# Patient Record
Sex: FEMALE | Race: WHITE | URBAN - METROPOLITAN AREA
[De-identification: names, ages, dates, MRNs, and addresses within clinical notes are randomized per-mention and may not be internally consistent; named-entity substitution may affect disease eponyms.]

---

## 2017-02-08 ENCOUNTER — HOSPITAL ENCOUNTER (OUTPATIENT)
Dept: PREADMISSION TESTING | Age: 47
Discharge: HOME | End: 2017-03-01

## 2025-02-08 ENCOUNTER — HOSPITAL ENCOUNTER (EMERGENCY)
Age: 55
Discharge: HOME | End: 2025-02-08
Payer: MEDICARE

## 2025-02-08 VITALS
HEART RATE: 90 BPM | HEIGHT: 64 IN | OXYGEN SATURATION: 98 % | TEMPERATURE: 98 F | WEIGHT: 234 LBS | DIASTOLIC BLOOD PRESSURE: 90 MMHG | RESPIRATION RATE: 20 BRPM | SYSTOLIC BLOOD PRESSURE: 143 MMHG | BODY MASS INDEX: 39.95 KG/M2

## 2025-02-08 DIAGNOSIS — M54.41 RIGHT-SIDED LOW BACK PAIN WITH RIGHT-SIDED SCIATICA, UNSPECIFIED CHRONICITY: Primary | ICD-10-CM

## 2025-02-08 PROCEDURE — 99284 EMERGENCY DEPT VISIT MOD MDM: CPT

## 2025-02-08 PROCEDURE — 96372 THER/PROPH/DIAG INJ SC/IM: CPT

## 2025-02-08 PROCEDURE — 6370000000 HC RX 637 (ALT 250 FOR IP): Performed by: PHYSICIAN ASSISTANT

## 2025-02-08 PROCEDURE — 6360000002 HC RX W HCPCS: Performed by: PHYSICIAN ASSISTANT

## 2025-02-08 RX ORDER — DULAGLUTIDE 3 MG/.5ML
INJECTION, SOLUTION SUBCUTANEOUS
COMMUNITY
Start: 2024-12-02

## 2025-02-08 RX ORDER — LORAZEPAM 0.5 MG
1 TABLET ORAL
COMMUNITY
Start: 2024-12-02

## 2025-02-08 RX ORDER — GABAPENTIN 400 MG/1
1 CAPSULE ORAL EVERY 8 HOURS
COMMUNITY
Start: 2025-01-29

## 2025-02-08 RX ORDER — ONDANSETRON 4 MG/1
4 TABLET, FILM COATED ORAL EVERY 8 HOURS PRN
Qty: 20 TABLET | Refills: 0 | Status: SHIPPED | OUTPATIENT
Start: 2025-02-08

## 2025-02-08 RX ORDER — FLUTICASONE PROPIONATE 50 MCG
2 SPRAY, SUSPENSION (ML) NASAL DAILY
COMMUNITY

## 2025-02-08 RX ORDER — HYDROCODONE BITARTRATE AND ACETAMINOPHEN 5; 325 MG/1; MG/1
1 TABLET ORAL ONCE
Status: COMPLETED | OUTPATIENT
Start: 2025-02-08 | End: 2025-02-08

## 2025-02-08 RX ORDER — ONDANSETRON 4 MG/1
4 TABLET, ORALLY DISINTEGRATING ORAL ONCE
Status: COMPLETED | OUTPATIENT
Start: 2025-02-08 | End: 2025-02-08

## 2025-02-08 RX ORDER — INSULIN LISPRO 100 [IU]/ML
INJECTION, SOLUTION INTRAVENOUS; SUBCUTANEOUS
COMMUNITY
Start: 2025-02-04

## 2025-02-08 RX ORDER — MONTELUKAST SODIUM 10 MG/1
1 TABLET ORAL NIGHTLY
COMMUNITY
Start: 2024-12-02

## 2025-02-08 RX ORDER — VITAMIN E 268 MG
400 CAPSULE ORAL 2 TIMES DAILY
COMMUNITY

## 2025-02-08 RX ORDER — LIDOCAINE 4 G/G
1 PATCH TOPICAL ONCE
Status: DISCONTINUED | OUTPATIENT
Start: 2025-02-08 | End: 2025-02-08 | Stop reason: HOSPADM

## 2025-02-08 RX ORDER — DEXAMETHASONE SODIUM PHOSPHATE 10 MG/ML
10 INJECTION, SOLUTION INTRAMUSCULAR; INTRAVENOUS ONCE
Status: COMPLETED | OUTPATIENT
Start: 2025-02-08 | End: 2025-02-08

## 2025-02-08 RX ORDER — KETOROLAC TROMETHAMINE 15 MG/ML
15 INJECTION, SOLUTION INTRAMUSCULAR; INTRAVENOUS ONCE
Status: COMPLETED | OUTPATIENT
Start: 2025-02-08 | End: 2025-02-08

## 2025-02-08 RX ORDER — FLUOXETINE 40 MG/1
40 CAPSULE ORAL DAILY
COMMUNITY

## 2025-02-08 RX ORDER — HYDROCODONE BITARTRATE AND ACETAMINOPHEN 5; 325 MG/1; MG/1
1 TABLET ORAL EVERY 4 HOURS PRN
Qty: 18 TABLET | Refills: 0 | Status: SHIPPED | OUTPATIENT
Start: 2025-02-08 | End: 2025-02-11

## 2025-02-08 RX ORDER — METHOCARBAMOL 500 MG/1
TABLET, FILM COATED ORAL
COMMUNITY
Start: 2025-01-29 | End: 2025-02-08

## 2025-02-08 RX ORDER — ORPHENADRINE CITRATE 100 MG/1
100 TABLET ORAL 2 TIMES DAILY
Qty: 20 TABLET | Refills: 0 | Status: SHIPPED | OUTPATIENT
Start: 2025-02-08 | End: 2025-02-18

## 2025-02-08 RX ORDER — ATENOLOL 25 MG/1
25 TABLET ORAL 2 TIMES DAILY
COMMUNITY
Start: 2024-09-09

## 2025-02-08 RX ORDER — LIDOCAINE 50 MG/G
1 PATCH TOPICAL DAILY
Qty: 10 PATCH | Refills: 0 | Status: SHIPPED | OUTPATIENT
Start: 2025-02-08 | End: 2025-02-18

## 2025-02-08 RX ORDER — HYDROXYZINE HYDROCHLORIDE 50 MG/1
TABLET, FILM COATED ORAL
COMMUNITY
Start: 2025-01-29

## 2025-02-08 RX ORDER — ATOGEPANT 30 MG/1
1 TABLET ORAL DAILY
COMMUNITY
Start: 2025-01-30

## 2025-02-08 RX ORDER — PANTOPRAZOLE SODIUM 40 MG/1
1 TABLET, DELAYED RELEASE ORAL DAILY
COMMUNITY
Start: 2024-12-02

## 2025-02-08 RX ORDER — BUPROPION HYDROCHLORIDE 150 MG/1
300 TABLET, EXTENDED RELEASE ORAL DAILY
COMMUNITY

## 2025-02-08 RX ORDER — ORPHENADRINE CITRATE 30 MG/ML
60 INJECTION INTRAMUSCULAR; INTRAVENOUS ONCE
Status: COMPLETED | OUTPATIENT
Start: 2025-02-08 | End: 2025-02-08

## 2025-02-08 RX ORDER — LUMATEPERONE 42 MG/1
CAPSULE ORAL
COMMUNITY

## 2025-02-08 RX ADMIN — DEXAMETHASONE SODIUM PHOSPHATE 10 MG: 10 INJECTION, SOLUTION INTRAMUSCULAR; INTRAVENOUS at 18:55

## 2025-02-08 RX ADMIN — ORPHENADRINE CITRATE 60 MG: 60 INJECTION INTRAMUSCULAR; INTRAVENOUS at 18:57

## 2025-02-08 RX ADMIN — ONDANSETRON 4 MG: 4 TABLET, ORALLY DISINTEGRATING ORAL at 18:55

## 2025-02-08 RX ADMIN — HYDROCODONE BITARTRATE AND ACETAMINOPHEN 1 TABLET: 5; 325 TABLET ORAL at 18:55

## 2025-02-08 RX ADMIN — KETOROLAC TROMETHAMINE 15 MG: 15 INJECTION, SOLUTION INTRAMUSCULAR; INTRAVENOUS at 18:56

## 2025-02-08 ASSESSMENT — PAIN - FUNCTIONAL ASSESSMENT
PAIN_FUNCTIONAL_ASSESSMENT: ACTIVITIES ARE NOT PREVENTED
PAIN_FUNCTIONAL_ASSESSMENT: 0-10

## 2025-02-08 ASSESSMENT — ENCOUNTER SYMPTOMS
VOMITING: 0
RHINORRHEA: 0
ABDOMINAL PAIN: 0
COUGH: 0
NAUSEA: 0
BACK PAIN: 1
SHORTNESS OF BREATH: 0
DIARRHEA: 0

## 2025-02-08 ASSESSMENT — PAIN SCALES - GENERAL
PAINLEVEL_OUTOF10: 9
PAINLEVEL_OUTOF10: 9

## 2025-02-08 ASSESSMENT — PAIN DESCRIPTION - PAIN TYPE: TYPE: ACUTE PAIN

## 2025-02-08 ASSESSMENT — PAIN DESCRIPTION - ORIENTATION: ORIENTATION: RIGHT

## 2025-02-08 ASSESSMENT — PAIN DESCRIPTION - DESCRIPTORS: DESCRIPTORS: SHARP

## 2025-02-08 ASSESSMENT — PAIN DESCRIPTION - ONSET: ONSET: ON-GOING

## 2025-02-08 ASSESSMENT — PAIN DESCRIPTION - FREQUENCY: FREQUENCY: CONTINUOUS

## 2025-02-08 ASSESSMENT — PAIN DESCRIPTION - LOCATION: LOCATION: LEG

## 2025-02-08 NOTE — ED PROVIDER NOTES
[facility] EMERGENCY DEPARTMENT  EMERGENCY DEPARTMENT ENCOUNTER        Pt Name: Minnie Bradshaw  MRN: [id number]  Birthdate 1970  Date of evaluation: 2/8/2025  Provider: Kelly Lopez PA-C  PCP: Hyacinth Morales APRN - CNP  Note Started: 6:41 PM EST 2/8/25      HENOK. I have evaluated this patient.        CHIEF COMPLAINT       Chief Complaint   Patient presents with    Leg Pain     Pt brought to the hospital due to having leg pain for the past approximately month, was seen at [facility] last month, and has had zero improvement.         HISTORY OF PRESENT ILLNESS: 1 or more Elements     History From: Patient, friend at bedside  Limitations to history : None    Minnie Bradshaw is a 54 y.o. female who presents to the emergency department today for evaluation for concerns of right-sided low back pain, with radiation down her right leg.  The patient reports that she was seen in [facility] on 1/25/2025 for similar symptoms.  She reports that she did have a CT scan at that time.  She states that she actually has an appointment with neurosurgery on 2/25/2025.  The patient reports that she has had continued pain since.  She states that she is on gabapentin chronically.  She states she is also been taking Robaxin without much improvement.  Patient reports that her pain is currently rated as a 9/10, pain is worse with touch and movement.  Improves with certain positions.  She denies any bowel or bladder incontinence or retention.  No saddle anesthesias.  She has no recent injections into the back.  She states that she did fall over 6 months ago but has not had any fall since she has not any fevers.  No nausea or vomiting.  She is a diabetic and glucose has otherwise been controlled patient otherwise has no    Nursing Notes were all reviewed and agreed with or any disagreements were addressed in the HPI.    REVIEW OF SYSTEMS :      Review of Systems   Constitutional:  Negative for activity change,

## 2025-03-03 ENCOUNTER — APPOINTMENT (OUTPATIENT)
Dept: GENERAL RADIOLOGY | Age: 55
End: 2025-03-03
Payer: MEDICARE

## 2025-03-03 ENCOUNTER — HOSPITAL ENCOUNTER (EMERGENCY)
Age: 55
Discharge: HOME | End: 2025-03-03
Payer: MEDICARE

## 2025-03-03 VITALS
OXYGEN SATURATION: 97 % | HEART RATE: 76 BPM | HEIGHT: 64 IN | TEMPERATURE: 98.3 F | SYSTOLIC BLOOD PRESSURE: 153 MMHG | DIASTOLIC BLOOD PRESSURE: 80 MMHG | RESPIRATION RATE: 20 BRPM | BODY MASS INDEX: 40.97 KG/M2 | WEIGHT: 240 LBS

## 2025-03-03 DIAGNOSIS — S80.01XA CONTUSION OF RIGHT KNEE, INITIAL ENCOUNTER: ICD-10-CM

## 2025-03-03 DIAGNOSIS — M54.31 SCIATICA OF RIGHT SIDE: Primary | ICD-10-CM
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
## 2025-03-03 PROCEDURE — 96372 THER/PROPH/DIAG INJ SC/IM: CPT

## 2025-03-03 PROCEDURE — 99284 EMERGENCY DEPT VISIT MOD MDM: CPT

## 2025-03-03 PROCEDURE — 6370000000 HC RX 637 (ALT 250 FOR IP)

## 2025-03-03 PROCEDURE — 6360000002 HC RX W HCPCS

## 2025-03-03 PROCEDURE — 73564 X-RAY EXAM KNEE 4 OR MORE: CPT

## 2025-03-03 RX ORDER — CYCLOBENZAPRINE HCL 10 MG
10 TABLET ORAL ONCE
Status: COMPLETED | OUTPATIENT
Start: 2025-03-03 | End: 2025-03-03

## 2025-03-03 RX ORDER — NAPROXEN 500 MG/1
500 TABLET ORAL 2 TIMES DAILY WITH MEALS
Qty: 60 TABLET | Refills: 0 | Status: SHIPPED | OUTPATIENT
Start: 2025-03-03

## 2025-03-03 RX ORDER — KETOROLAC TROMETHAMINE 30 MG/ML
30 INJECTION, SOLUTION INTRAMUSCULAR; INTRAVENOUS ONCE
Status: COMPLETED | OUTPATIENT
Start: 2025-03-03 | End: 2025-03-03

## 2025-03-03 RX ORDER — LIDOCAINE 4 G/G
1 PATCH TOPICAL DAILY
Status: DISCONTINUED | OUTPATIENT
Start: 2025-03-03 | End: 2025-03-03 | Stop reason: HOSPADM

## 2025-03-03 RX ORDER — LIDOCAINE 50 MG/G
1 PATCH TOPICAL DAILY
Qty: 10 PATCH | Refills: 0 | Status: SHIPPED | OUTPATIENT
Start: 2025-03-03 | End: 2025-03-13

## 2025-03-03 RX ADMIN — KETOROLAC TROMETHAMINE 30 MG: 30 INJECTION, SOLUTION INTRAMUSCULAR at 18:42

## 2025-03-03 RX ADMIN — CYCLOBENZAPRINE HYDROCHLORIDE 10 MG: 10 TABLET, FILM COATED ORAL at 18:43

## 2025-03-03 ASSESSMENT — PAIN - FUNCTIONAL ASSESSMENT
PAIN_FUNCTIONAL_ASSESSMENT: ACTIVITIES ARE NOT PREVENTED
PAIN_FUNCTIONAL_ASSESSMENT: 0-10

## 2025-03-03 ASSESSMENT — PAIN DESCRIPTION - FREQUENCY: FREQUENCY: CONTINUOUS

## 2025-03-03 ASSESSMENT — PAIN SCALES - GENERAL: PAINLEVEL_OUTOF10: 7

## 2025-03-03 ASSESSMENT — PAIN DESCRIPTION - PAIN TYPE: TYPE: ACUTE PAIN

## 2025-03-03 ASSESSMENT — PAIN DESCRIPTION - ORIENTATION: ORIENTATION: RIGHT

## 2025-03-03 ASSESSMENT — PAIN DESCRIPTION - LOCATION: LOCATION: LEG;KNEE

## 2025-03-03 ASSESSMENT — PAIN DESCRIPTION - DESCRIPTORS: DESCRIPTORS: ACHING

## 2025-03-03 ASSESSMENT — PAIN DESCRIPTION - ONSET: ONSET: ON-GOING

## 2025-03-03 NOTE — DISCHARGE INSTRUCTIONS
Your x-ray does not show any fracture or dislocation of the knee.    Your exam otherwise is consistent with your known sciatica.  I did send a prescription for naproxen to your pharmacy.  This is similar to Motrin, ibuprofen, Aleve, Advil, meloxicam, diclofenac (nonsteroidal anti-inflammatory drugs).  Okay to take Tylenol and apply any topical pain relief such as

## 2025-03-04 ASSESSMENT — ENCOUNTER SYMPTOMS
ABDOMINAL PAIN: 0
COLOR CHANGE: 0
BACK PAIN: 1
NAUSEA: 0
VOMITING: 0

## 2025-03-04 NOTE — ED PROVIDER NOTES
naproxen (NAPROSYN) 500 MG tablet Take 1 tablet by mouth 2 times daily (with meals), Disp-60 tablet, R-0Normal      lidocaine (LIDODERM) 5 % Place 1 patch onto the skin daily for 10 days 12 hours on, 12 hours off., Disp-10 patch, R-0Normal             DISCONTINUED MEDICATIONS:  Discharge Medication List as of 3/3/2025  7:04 PM                 (Please note that portions of this note were completed with a voice recognition program.  Efforts were made to edit the dictations but occasionally words are mis-transcribed.)    EMBER Darden CNP (electronically signed)      Juliet Montes APRN - CNP  03/04/25 0395

## 2025-03-07 ENCOUNTER — TELEPHONE (OUTPATIENT)
Dept: ORTHOPEDIC SURGERY | Age: 55
End: 2025-03-07

## 2025-03-10 ENCOUNTER — OFFICE VISIT (OUTPATIENT)
Dept: ORTHOPEDIC SURGERY | Age: 55
End: 2025-03-10
Payer: MEDICARE

## 2025-03-10 VITALS — BODY MASS INDEX: 40.97 KG/M2 | HEIGHT: 64 IN | WEIGHT: 240 LBS

## 2025-03-10 DIAGNOSIS — M54.50 LOW BACK PAIN, UNSPECIFIED BACK PAIN LATERALITY, UNSPECIFIED CHRONICITY, UNSPECIFIED WHETHER SCIATICA PRESENT: Primary | ICD-10-CM

## 2025-03-10 DIAGNOSIS — M47.27 MULTILEVEL LUMBOSACRAL SPONDYLOSIS WITH RADICULOPATHY: ICD-10-CM

## 2025-03-10 PROCEDURE — 1036F TOBACCO NON-USER: CPT | Performed by: PHYSICIAN ASSISTANT

## 2025-03-10 PROCEDURE — 3017F COLORECTAL CA SCREEN DOC REV: CPT | Performed by: PHYSICIAN ASSISTANT

## 2025-03-10 PROCEDURE — 99203 OFFICE O/P NEW LOW 30 MIN: CPT | Performed by: PHYSICIAN ASSISTANT

## 2025-03-10 PROCEDURE — G8417 CALC BMI ABV UP PARAM F/U: HCPCS | Performed by: PHYSICIAN ASSISTANT

## 2025-03-10 PROCEDURE — G8427 DOCREV CUR MEDS BY ELIG CLIN: HCPCS | Performed by: PHYSICIAN ASSISTANT

## 2025-03-10 NOTE — PROGRESS NOTES
History of present illness:   Ms. Minnie Bradshaw is a pleasant 54 y.o. female kindly referred by [facility] for consultation regarding her low back pain right medial and anterior thigh pain that radiates down to her right foot.  She states she has had chronic back pain for years.  The right leg began about 3 months ago with worsening low back pain.  Back pain 10/10 VAS, right and left buttock pain 0/10 VAS, right leg pain 10/10 VAS.   Pain is describes as sharp pain.   She denies numbness and tingling lower extremities.    She denies weakness of her right and left leg.  She denies bowel or bladder dysfunction and saddle anesthesia.   She can sit for a maximum of 10 minutes and stand for a maximum 10 minutes.   Pain does disrupt her sleep.   Prior medications and treatment tried include gabapentin 100 mg 3 times daily, Lidoderm patch, Naprosyn without relief.    She states she is currently disabled since 2001 after a C4-C7 fusion    Past medical history:  Her past medical history has been reviewed.  Past Medical History:   Diagnosis Date    Asthma     Bipolar affective (HCC)     Diabetes mellitus (HCC)     GERD (gastroesophageal reflux disease)     Hyperlipidemia     Hypertension     Mood disorder     Sleep apnea     CPAP set 12-20, fluctuates    Thyroid disease        Her past surgical history has been reviewed.  Past Surgical History:   Procedure Laterality Date    CERVICAL DISC SURGERY  2005    CYSTOSCOPY  1/26/11    HEEL SPUR SURGERY           Her medications and allergies were reviewed.  Current Outpatient Medications   Medication Sig Dispense Refill    naproxen (NAPROSYN) 500 MG tablet Take 1 tablet by mouth 2 times daily (with meals) 60 tablet 0    lidocaine (LIDODERM) 5 % Place 1 patch onto the skin daily for 10 days 12 hours on, 12 hours off. 10 patch 0    atenolol (TENORMIN) 25 MG tablet Take 1 tablet by mouth 2 times daily      Atogepant (QULIPTA) 30 MG TABS Take 1 tablet by mouth daily      buPROPion

## 2025-03-13 ENCOUNTER — HOSPITAL ENCOUNTER (OUTPATIENT)
Dept: MRI IMAGING | Age: 55
Discharge: HOME | End: 2025-03-13
Payer: MEDICARE

## 2025-03-13 DIAGNOSIS — M47.27 MULTILEVEL LUMBOSACRAL SPONDYLOSIS WITH RADICULOPATHY: ICD-10-CM

## 2025-03-13 PROCEDURE — 72148 MRI LUMBAR SPINE W/O DYE: CPT

## 2025-03-19 ENCOUNTER — RESULTS FOLLOW-UP (OUTPATIENT)
Dept: MRI IMAGING | Age: 55
End: 2025-03-19

## 2025-03-25 ENCOUNTER — TELEPHONE (OUTPATIENT)
Dept: ORTHOPEDIC SURGERY | Age: 55
End: 2025-03-25

## 2025-03-25 DIAGNOSIS — M47.27 MULTILEVEL LUMBOSACRAL SPONDYLOSIS WITH RADICULOPATHY: Primary | ICD-10-CM

## 2025-03-25 DIAGNOSIS — M48.062 SPINAL STENOSIS OF LUMBAR REGION WITH NEUROGENIC CLAUDICATION: ICD-10-CM

## 2025-03-25 NOTE — TELEPHONE ENCOUNTER
Called and LMOM will try again later this afternoon or tomorrow.    MRI Lumbar Spine:  IMPRESSION:  1. Severe spinal canal stenosis and severe bilateral neural foraminal  narrowing at L4-5.  2. Severe spinal canal stenosis, severe right and mild left neural foraminal  narrowing at L5-S1.  3. Mild spinal canal stenosis at L3-4.

## 2025-03-25 NOTE — TELEPHONE ENCOUNTER
Test Results     Type of Test: MRI  Date of Test: 3/13/25  Location of Test: [facility]  Patient Contact Number: [phone]

## 2025-03-26 NOTE — TELEPHONE ENCOUNTER
Called and discussed MRI.  Discussed Epidural steroid injection vs surgery.   She would prefer to see surgeon.    Surgical consultation with Hiram AVILES with [facility].

## 2025-07-02 NOTE — PROGRESS NOTES
PATIENT REACHED   YES____NO__X__    PREOP INSTRUCTIONS LEFT ON VM ZFANJI__[phone]_____________      DATE_7/10/25________ TIME__1215_______ARRIVAL_1115_______PLACE__[address]__________  NOTHING TO EAT OR DRINK  AFTER MIDNIGHT THE EVENING PRIOR OR AS INSTRUCTED BY YOUR DR.  YOU NEED A RESPONSIBLE ADULT AGE 18 OR OLDER TO DRIVE YOU HOME  PLEASE BRING INSURANCE CARD.PICTURE ID AND COMPLETE LIST OF MEDS  WEAR LOOSE COMFORTABLE CLOTHING  FOLLOW ANY INSTRUCTIONS YOUR DRS OFFICE HAS GIVEN YOU,INCLUDING WHAT MEDICATIONS TO TAKE THE AM OF PROCEDURE AND WHEN AND IF YOU NEED TO STOP ANY BLOOD THINNERS. IF YOU HAVE QUESTIONS REGARDING THIS CALL THE OFFICE  THE GOAL BLOOD SUGAR THE AM OF PROCEDURE  OR LESS ABOVE THAT THE PROCEDURE MAY BE CANCELLED  ANY QUESTIONS CALL YOUR DOCTOR.ALSO,PLEASE READ THE INSTRUCTION PACKET FROM YOUR DR IF YOU RECEIVED ONE.  SPINE INTERVENTION NUMBER [phone]      OTHER___________________________________      VISITOR POLICY(subject to change)    Current policy is 2 visitors per patient. No children. Masks are required.

## 2025-07-10 ENCOUNTER — APPOINTMENT (OUTPATIENT)
Dept: GENERAL RADIOLOGY | Age: 55
End: 2025-07-10
Attending: PHYSICAL MEDICINE & REHABILITATION
Payer: MEDICARE

## 2025-07-10 ENCOUNTER — HOSPITAL ENCOUNTER (OUTPATIENT)
Age: 55
Setting detail: OUTPATIENT SURGERY
Discharge: HOME | End: 2025-07-10
Attending: PHYSICAL MEDICINE & REHABILITATION | Admitting: PHYSICAL MEDICINE & REHABILITATION
Payer: MEDICARE

## 2025-07-10 VITALS
OXYGEN SATURATION: 98 % | BODY MASS INDEX: 40.29 KG/M2 | TEMPERATURE: 97.7 F | SYSTOLIC BLOOD PRESSURE: 109 MMHG | RESPIRATION RATE: 14 BRPM | DIASTOLIC BLOOD PRESSURE: 65 MMHG | WEIGHT: 236 LBS | HEART RATE: 65 BPM | HEIGHT: 64 IN

## 2025-07-10 LAB
GLUCOSE BLD-MCNC: 252 MG/DL (ref 70–99)
PERFORMED ON: ABNORMAL

## 2025-07-10 PROCEDURE — 6360000002 HC RX W HCPCS: Performed by: PHYSICAL MEDICINE & REHABILITATION

## 2025-07-10 PROCEDURE — 99152 MOD SED SAME PHYS/QHP 5/>YRS: CPT | Performed by: PHYSICAL MEDICINE & REHABILITATION

## 2025-07-10 PROCEDURE — 6360000004 HC RX CONTRAST MEDICATION: Performed by: PHYSICAL MEDICINE & REHABILITATION

## 2025-07-10 PROCEDURE — 3610000056 HC PAIN LEVEL 4 BASE (NON-OR): Performed by: PHYSICAL MEDICINE & REHABILITATION

## 2025-07-10 PROCEDURE — 77003 FLUOROGUIDE FOR SPINE INJECT: CPT

## 2025-07-10 PROCEDURE — 2709999900 HC NON-CHARGEABLE SUPPLY: Performed by: PHYSICAL MEDICINE & REHABILITATION

## 2025-07-10 RX ORDER — MIDAZOLAM HYDROCHLORIDE 1 MG/ML
INJECTION, SOLUTION INTRAMUSCULAR; INTRAVENOUS
Status: DISCONTINUED | OUTPATIENT
Start: 2025-07-10 | End: 2025-07-10 | Stop reason: ALTCHOICE

## 2025-07-10 RX ORDER — LIDOCAINE HYDROCHLORIDE 10 MG/ML
INJECTION, SOLUTION EPIDURAL; INFILTRATION; INTRACAUDAL; PERINEURAL
Status: DISCONTINUED | OUTPATIENT
Start: 2025-07-10 | End: 2025-07-10 | Stop reason: ALTCHOICE

## 2025-07-10 RX ORDER — AMLODIPINE BESYLATE 2.5 MG/1
2.5 TABLET ORAL DAILY
COMMUNITY

## 2025-07-10 RX ORDER — MECLIZINE HCL 25MG 25 MG/1
25 TABLET, CHEWABLE ORAL 3 TIMES DAILY PRN
COMMUNITY

## 2025-07-10 RX ORDER — BUSPIRONE HYDROCHLORIDE 30 MG/1
TABLET ORAL
COMMUNITY

## 2025-07-10 RX ORDER — FENTANYL CITRATE 50 UG/ML
INJECTION, SOLUTION INTRAMUSCULAR; INTRAVENOUS
Status: DISCONTINUED | OUTPATIENT
Start: 2025-07-10 | End: 2025-07-10 | Stop reason: ALTCHOICE

## 2025-07-10 RX ORDER — DEXAMETHASONE SODIUM PHOSPHATE 10 MG/ML
INJECTION, SOLUTION INTRAMUSCULAR; INTRAVENOUS
Status: DISCONTINUED | OUTPATIENT
Start: 2025-07-10 | End: 2025-07-10 | Stop reason: ALTCHOICE

## 2025-07-10 RX ORDER — IOPAMIDOL 612 MG/ML
INJECTION, SOLUTION INTRATHECAL
Status: DISCONTINUED | OUTPATIENT
Start: 2025-07-10 | End: 2025-07-10 | Stop reason: ALTCHOICE

## 2025-07-10 ASSESSMENT — PAIN - FUNCTIONAL ASSESSMENT
PAIN_FUNCTIONAL_ASSESSMENT: NONE - DENIES PAIN
PAIN_FUNCTIONAL_ASSESSMENT: NONE - DENIES PAIN
PAIN_FUNCTIONAL_ASSESSMENT: PREVENTS OR INTERFERES SOME ACTIVE ACTIVITIES AND ADLS
PAIN_FUNCTIONAL_ASSESSMENT: NONE - DENIES PAIN
PAIN_FUNCTIONAL_ASSESSMENT: 0-10
PAIN_FUNCTIONAL_ASSESSMENT: NONE - DENIES PAIN

## 2025-07-10 ASSESSMENT — PAIN DESCRIPTION - DESCRIPTORS: DESCRIPTORS: SHARP

## 2025-07-10 NOTE — OP NOTE
PATIENT:  Minnie Bradshaw  AGE:  54 yrs  MEDICAL RECORD #:  [id number]  YOB: 1970     DATE:  7/10/2025  PHYSICIAN: Jc Fernandez M.D.     PROCEDURE: Bilateral L4 transforaminal epidural steroid injection under fluoroscopy.     PRE-OP DIAGNOSIS:  Low Back Pain/Radiculopathy     POST-OP DIAGNOSIS:  same     HISTORY OF PRESENT ILLNESS:  See office notes. Patient has failed previous less-invasive treatments.     ALLERGIES:  Topamax     MEDICATIONS:    Current Facility-Administered Medications   Medication Dose Route Frequency Provider Last Rate Last Admin    fentaNYL (SUBLIMAZE) injection    Once PRN Jc Fernandez MD   25 mcg at 07/10/25 1202    midazolam (VERSED)    Once PRN Jc Fernandez MD   0.5 mg at 07/10/25 1202    dexAMETHasone (PF) (DECADRON)    Once PRN Jc Fernandez MD   20 mg at 07/10/25 1207    lidocaine PF 1 % injection    Once PRN Jc Fernandez MD   7 mg at 07/10/25 1207    iopamidol (ISOVUE-M 300) 61 % injection    Once PRN Jc Fernandez MD   3 mL at 07/10/25 1207        PHYSICAL EXAMINATION:              General:  Awake, alert              Heart:  No audible murmurs, extremities well perfused              Lungs:  No increased WOB or audible wheezing              Extremities:  Normal tone. Warm. No swelling.      Anesthesia: 0.5 mg Versed and 25 mcg fentanyl    Estimated blood loss: None  Complications: None  Specimen: None    DESCRIPTION OF PROCEDURE:     Components of the procedure were again reviewed with the patient prior to the procedure.  She is aware of risks including infection, bleeding, allergic reaction, and nerve injury.  She had ample opportunity for additional questions.  She elected to proceed with treatment.     The patient was placed in the prone position.  Cardiovascular monitoring was initiated, and vital signs were stable prior to, during, and after the procedure.  Utilizing fluoroscopy, the L4 vertebrae was identified.  The area was sterilely prepped

## 2025-07-10 NOTE — H&P
HISTORY AND PHYSICAL/PRE-SEDATION ASSESSMENT    Patient:  Minnie Bradshaw   :  1970  Medical Record No.:  [id number]   Date:  7/10/2025  Physician:  Jc Fernandez MD  Facility: [facility]    HISTORY OF PRESENT ILLNESS:                 The patient is a 54 y.o. female whom presents with leg pain. Review of the imaging and physical exam of the patient confirmed the pre-procedure diagnosis.  After a thorough discussion of risks, benefits and alternatives informed consent was obtained.    Diagnosis:  Pre-Op Diagnosis Codes:      * Lumbar radiculopathy [M54.16]    Past Medical History:   Past Medical History:   Diagnosis Date    Asthma     Bipolar affective (HCC)     Diabetes mellitus (HCC)     GERD (gastroesophageal reflux disease)     Hyperlipidemia     Hypertension     Mood disorder     Sleep apnea     CPAP set 12-20, fluctuates    Thyroid disease         Past Surgical History:     Past Surgical History:   Procedure Laterality Date    CERVICAL DISC SURGERY      CYSTOSCOPY  11    HEEL SPUR SURGERY         Current Medications:   Prior to Admission medications    Medication Sig Start Date End Date Taking? Authorizing Provider   amLODIPine (NORVASC) 2.5 MG tablet Take 1 tablet by mouth daily   Yes Giovany Don MD   meclizine (ANTIVERT) 25 MG CHEW Take 1 tablet by mouth 3 times daily as needed   Yes Giovany Don MD   busPIRone (BUSPAR) 30 MG tablet BUSPIRONE HCL 30 MG TABS   Yes Giovany Don MD   furosemide (LASIX) 20 MG tablet Take 1 tablet by mouth as needed   Yes Giovany Don MD    ULTRA STRENGTH 50 MCG (2000 UT) CAPS capsule Take 1 capsule by mouth 24  Yes Giovany Don MD   FLUoxetine (PROZAC) 40 MG capsule Take 1 capsule by mouth daily   Yes Giovany Don MD   fluticasone (FLONASE) 50 MCG/ACT nasal spray 2 sprays by Nasal route daily   Yes Giovany Don MD   gabapentin (NEURONTIN) 400 MG capsule Take  MG EC tablet Take 1 tablet by mouth daily    Provider, MD Giovany       Allergies:  Topamax    Social History:    reports that she quit smoking about 10 years ago. Her smoking use included cigarettes. She has never used smokeless tobacco. She reports that she does not currently use drugs after having used the following drugs: Marijuana (Weed). She reports that she does not drink alcohol.    Family History:   History reviewed. No pertinent family history.     Vitals: Blood pressure (!) 148/89, pulse 67, temperature 97.7 °F (36.5 °C), temperature source Temporal, resp. rate 16, height 1.626 m (5' 4\"), weight 107 kg (236 lb), last menstrual period 10/02/2014, SpO2 98%.    PHYSICAL EXAM:  HENT: Airway patent and reviewed  Cardiovascular: Normal rate, regular rhythm, normal heart sounds.   Pulmonary/Chest: No wheezes. No rhonchi. No rales.   Abdominal: Soft. Bowel sounds are normal. No distension.  Extremities: Moves all extremities equally  Lumbar Spine: Painful range of motion, no midline tenderness     ASA CLASS:         []   I. Normal, healthy adult           [x]   II.  Mild systemic disease            []   III.  Severe systemic disease    Mallampati: Mallampati Class II - (soft palate, fauces & uvula are visible)      Sedation plan:   []  Local              [x]  Minimal                  []  General anesthesia    Treatment plan:  Patient's condition acceptable for planned procedure/sedation.  Proceed with planned procedure   Post Procedure Plan   Return to same level of care   ______________________     The risks and benefits as well as alternatives to the procedure have been discussed with the patient and or family.  The patient and/or next of kin understands and agrees to proceed.    Jc Fernandez MD

## 2025-07-10 NOTE — PROGRESS NOTES
BILATERAL L4 TRANSFORAMINAL EPIDURAL STEROID INJECTION WITH FLUOROSCOPY sites observed and reported at handoff.    IV discontinued, catheter intact, and dressing applied.    Procedural dressing dry and intact.    Bilateral lower extremities equal in strength.    Discharge instructions reviewed with patient or responsible adult, signed and copy given.  All home medications have been reviewed.  All questions answered and patient or responsible adult verbalized understanding.  PAIN LEVEL AT DISCHARGE __0__

## (undated) DEVICE — NEEDLE SPNL 22GA L5IN BLK HUB S STL W/ QNCKE PNT W/OUT

## (undated) DEVICE — TRAY ANES SUPP NO DRUG CUST

## (undated) DEVICE — NEEDLE SPNL 22GA L3.5IN BLK HUB S STL REG WALL FIT STYL

## (undated) DEVICE — AVANOS* EXTENSION SETS: Brand: EXTENSION SET, MINI BORE, 12" LENGTH, 0.50ML VOLUME 25

## (undated) DEVICE — STANDARD HYPODERMIC NEEDLE,POLYPROPYLENE HUB: Brand: MONOJECT

## (undated) DEVICE — MEDIA CONTRAST RX ISOVUE-300 61% 30ML VIALS

## (undated) DEVICE — GLOVE ORANGE PI 8   MSG9080

## (undated) DEVICE — SYRINGE MED 3ML CLR PLAS LUERLOCK CONN VI ACCS INTLNK 15GA

## (undated) DEVICE — APPLICATOR MEDICATED 26 CC SOLUTION HI LT ORNG CHLORAPREP